# Patient Record
Sex: FEMALE | Race: WHITE | NOT HISPANIC OR LATINO | Employment: UNEMPLOYED | URBAN - METROPOLITAN AREA
[De-identification: names, ages, dates, MRNs, and addresses within clinical notes are randomized per-mention and may not be internally consistent; named-entity substitution may affect disease eponyms.]

---

## 2018-01-13 ENCOUNTER — HOSPITAL ENCOUNTER (INPATIENT)
Facility: HOSPITAL | Age: 28
LOS: 2 days | Discharge: HOME | End: 2018-01-15
Attending: OBSTETRICS & GYNECOLOGY | Admitting: OBSTETRICS & GYNECOLOGY

## 2018-01-13 LAB
ALLENS TEST: ABNORMAL
BASOPHILS # BLD AUTO: 0.01 K/UL
BASOPHILS NFR BLD: 0.1 %
DIFFERENTIAL METHOD: ABNORMAL
EOSINOPHIL # BLD AUTO: 0 K/UL
EOSINOPHIL NFR BLD: 0 %
ERYTHROCYTE [DISTWIDTH] IN BLOOD BY AUTOMATED COUNT: 13 %
HCO3 UR-SCNC: 19 MMOL/L (ref 24–28)
HCT VFR BLD AUTO: 31.6 %
HGB BLD-MCNC: 11.1 G/DL
HIV1+2 IGG SERPL QL IA.RAPID: NEGATIVE
LYMPHOCYTES # BLD AUTO: 1.6 K/UL
LYMPHOCYTES NFR BLD: 8.5 %
MCH RBC QN AUTO: 34.8 PG
MCHC RBC AUTO-ENTMCNC: 35.1 G/DL
MCV RBC AUTO: 99 FL
MONOCYTES # BLD AUTO: 0.9 K/UL
MONOCYTES NFR BLD: 4.6 %
NEUTROPHILS # BLD AUTO: 16.8 K/UL
NEUTROPHILS NFR BLD: 86.8 %
PCO2 BLDA: 32.4 MMHG (ref 35–45)
PH SMN: 7.38 [PH] (ref 7.35–7.45)
PLATELET # BLD AUTO: 218 K/UL
PMV BLD AUTO: 9.6 FL
PO2 BLDA: 32 MMHG (ref 80–100)
POC BE: -6 MMOL/L
POC SATURATED O2: 62 % (ref 95–100)
RBC # BLD AUTO: 3.19 M/UL
RPR SER QL: NORMAL
SAMPLE: ABNORMAL
SITE: ABNORMAL
WBC # BLD AUTO: 19.28 K/UL

## 2018-01-13 PROCEDURE — 59400 OBSTETRICAL CARE: CPT | Mod: ,,, | Performed by: OBSTETRICS & GYNECOLOGY

## 2018-01-13 PROCEDURE — 25000003 PHARM REV CODE 250: Performed by: MIDWIFE

## 2018-01-13 PROCEDURE — 82803 BLOOD GASES ANY COMBINATION: CPT

## 2018-01-13 PROCEDURE — 86850 RBC ANTIBODY SCREEN: CPT

## 2018-01-13 PROCEDURE — 86592 SYPHILIS TEST NON-TREP QUAL: CPT

## 2018-01-13 PROCEDURE — 11000001 HC ACUTE MED/SURG PRIVATE ROOM

## 2018-01-13 PROCEDURE — 36416 COLLJ CAPILLARY BLOOD SPEC: CPT

## 2018-01-13 PROCEDURE — 85025 COMPLETE CBC W/AUTO DIFF WBC: CPT

## 2018-01-13 PROCEDURE — 72100002 HC LABOR CARE, 1ST 8 HOURS

## 2018-01-13 PROCEDURE — 86762 RUBELLA ANTIBODY: CPT

## 2018-01-13 PROCEDURE — 86703 HIV-1/HIV-2 1 RESULT ANTBDY: CPT

## 2018-01-13 PROCEDURE — 87340 HEPATITIS B SURFACE AG IA: CPT

## 2018-01-13 PROCEDURE — 72200005 HC VAGINAL DELIVERY LEVEL II

## 2018-01-13 PROCEDURE — 99900035 HC TECH TIME PER 15 MIN (STAT)

## 2018-01-13 RX ORDER — AMMONIA 15 % (W/V)
0.3 AMPUL (EA) INHALATION CONTINUOUS PRN
Status: DISCONTINUED | OUTPATIENT
Start: 2018-01-13 | End: 2018-01-15 | Stop reason: HOSPADM

## 2018-01-13 RX ORDER — HYDROCORTISONE 25 MG/G
CREAM TOPICAL 3 TIMES DAILY PRN
Status: DISCONTINUED | OUTPATIENT
Start: 2018-01-13 | End: 2018-01-15 | Stop reason: HOSPADM

## 2018-01-13 RX ORDER — DIPHENHYDRAMINE HCL 25 MG
25 CAPSULE ORAL EVERY 4 HOURS PRN
Status: DISCONTINUED | OUTPATIENT
Start: 2018-01-13 | End: 2018-01-15 | Stop reason: HOSPADM

## 2018-01-13 RX ORDER — OXYCODONE AND ACETAMINOPHEN 5; 325 MG/1; MG/1
1 TABLET ORAL EVERY 4 HOURS PRN
Status: DISCONTINUED | OUTPATIENT
Start: 2018-01-13 | End: 2018-01-15 | Stop reason: HOSPADM

## 2018-01-13 RX ORDER — ACETAMINOPHEN 325 MG/1
650 TABLET ORAL EVERY 6 HOURS PRN
Status: DISCONTINUED | OUTPATIENT
Start: 2018-01-13 | End: 2018-01-15 | Stop reason: HOSPADM

## 2018-01-13 RX ORDER — DOCUSATE SODIUM 100 MG/1
100 CAPSULE, LIQUID FILLED ORAL DAILY
Status: DISCONTINUED | OUTPATIENT
Start: 2018-01-14 | End: 2018-01-15 | Stop reason: HOSPADM

## 2018-01-13 RX ORDER — ONDANSETRON 8 MG/1
8 TABLET, ORALLY DISINTEGRATING ORAL EVERY 8 HOURS PRN
Status: DISCONTINUED | OUTPATIENT
Start: 2018-01-13 | End: 2018-01-15 | Stop reason: HOSPADM

## 2018-01-13 RX ORDER — IBUPROFEN 600 MG/1
600 TABLET ORAL EVERY 6 HOURS
Status: DISCONTINUED | OUTPATIENT
Start: 2018-01-13 | End: 2018-01-15 | Stop reason: HOSPADM

## 2018-01-13 RX ADMIN — IBUPROFEN 600 MG: 600 TABLET, FILM COATED ORAL at 11:01

## 2018-01-13 RX ADMIN — IBUPROFEN 600 MG: 600 TABLET, FILM COATED ORAL at 06:01

## 2018-01-14 LAB
ABO + RH BLD: NORMAL
ABO + RH BLD: NORMAL
AMPHET+METHAMPHET UR QL: NORMAL
BARBITURATES UR QL SCN>200 NG/ML: NEGATIVE
BENZODIAZ UR QL SCN>200 NG/ML: NEGATIVE
BLD GP AB SCN CELLS X3 SERPL QL: NORMAL
BLD GP AB SCN CELLS X3 SERPL QL: NORMAL
BZE UR QL SCN: NEGATIVE
CANNABINOIDS UR QL SCN: NORMAL
CREAT UR-MCNC: 58 MG/DL
FETAL CELL SCN BLD QL ROSETTE: NORMAL
INJECT RH IG VOL PATIENT: NORMAL ML
METHADONE UR QL SCN>300 NG/ML: NEGATIVE
OPIATES UR QL SCN: NEGATIVE
PCP UR QL SCN>25 NG/ML: NEGATIVE
TOXICOLOGY INFORMATION: NORMAL

## 2018-01-14 PROCEDURE — 36415 COLL VENOUS BLD VENIPUNCTURE: CPT

## 2018-01-14 PROCEDURE — 80307 DRUG TEST PRSMV CHEM ANLYZR: CPT

## 2018-01-14 PROCEDURE — 99231 SBSQ HOSP IP/OBS SF/LOW 25: CPT | Mod: ,,, | Performed by: ADVANCED PRACTICE MIDWIFE

## 2018-01-14 PROCEDURE — 11000001 HC ACUTE MED/SURG PRIVATE ROOM

## 2018-01-14 PROCEDURE — 25000003 PHARM REV CODE 250: Performed by: MIDWIFE

## 2018-01-14 PROCEDURE — 86901 BLOOD TYPING SEROLOGIC RH(D): CPT

## 2018-01-14 PROCEDURE — 85461 HEMOGLOBIN FETAL: CPT

## 2018-01-14 RX ADMIN — IBUPROFEN 600 MG: 600 TABLET, FILM COATED ORAL at 12:01

## 2018-01-14 RX ADMIN — IBUPROFEN 600 MG: 600 TABLET, FILM COATED ORAL at 06:01

## 2018-01-14 NOTE — PLAN OF CARE
Problem: Patient Care Overview  Goal: Plan of Care Review  Outcome: Ongoing (interventions implemented as appropriate)  VVS.vaible patient. Fundus below umbilicus. Pain well controlled with p.o. Pain medication. Lochia rubra and light per patient report. Safety maintained . Progress will continue to be monitored.

## 2018-01-14 NOTE — HOSPITAL COURSE
Pt admitted for precipitous labor and delivery, infant delivered in marilou breech position with Dr Hardy  1/14/18 pp day 1, routine pp care  01/15/18-routine PP care

## 2018-01-14 NOTE — PROGRESS NOTES
[facility] -   Obstetrics  Postpartum Progress Note    Patient Name: Bhavin Jaramillo  MRN: [id number]  Admission Date: 1/13/2018  Hospital Length of Stay: 1 days  Attending Physician: Mila Hardy, *  Primary Care Provider: Ceferino Dias MD    Subjective:     Principal Problem:Precipitous delivery, delivered (current hospitalization)    Hospital course: Pt admitted for precipitous labor and delivery, infant delivered in marilou breech position with Dr Hardy  1/14/18 pp day 1, routine pp care    Interval History:     She is doing well this morning. She is tolerating a regular diet without nausea or vomiting. She is voiding spontaneously. She is ambulating. She has passed flatus, and has not a BM. Vaginal bleeding is mild. She denies fever or chills. Abdominal pain is mild and controlled with oral medications. She is not breastfeeding. She desires circumcision for her male baby: not applicable.    Objective:     Vital Signs (Most Recent):  Temp: 97.6 °F (36.4 °C) (01/14/18 0000)  Pulse: 75 (01/14/18 0000)  Resp: 18 (01/14/18 0000)  BP: 125/76 (01/14/18 0000) Vital Signs (24h Range):  Temp:  [97.6 °F (36.4 °C)-98.5 °F (36.9 °C)] 97.6 °F (36.4 °C)  Pulse:  [66-92] 75  Resp:  [18-20] 18  BP: (122-156)/(65-95) 125/76     Weight: 70.3 kg (155 lb)  Body mass index is 23.57 kg/m².      Intake/Output Summary (Last 24 hours) at 01/14/18 0748  Last data filed at 01/14/18 0000   Gross per 24 hour   Intake                0 ml   Output              450 ml   Net             -450 ml       Significant Labs:  Lab Results   Component Value Date    GROUPTRH B NEG 01/13/2018       Recent Labs  Lab 01/13/18  2135   HGB 11.1*   HCT 31.6*       I have personallly reviewed all pertinent lab results from the last 24 hours.    Physical Exam:   Constitutional: She is oriented to person, place, and time. She appears well-developed and well-nourished.             Abdominal: Soft.   Fundus firm below umbilicus      Genitourinary:   Genitourinary Comments: Small lochia           Musculoskeletal: Normal range of motion and moves all extremeties.       Neurological: She is alert and oriented to person, place, and time.    Skin: Skin is warm and dry.    Psychiatric: She has a normal mood and affect. Her behavior is normal.       Assessment/Plan:     27 y.o. female  for:    * Precipitous delivery, delivered (current hospitalization)    Routine PP care  Infant to NICU            Disposition: As patient meets milestones, will plan to discharge tomorrow.    ROSE Fleming CNM  Obstetrics  [facility] [place]

## 2018-01-14 NOTE — PROGRESS NOTES
During assessment, two quarter sized clots noted on jesu pad; fundus is firm and at umbilicus, moderate amount of lochia on pad. Instructed pt to call nurse if saturating a jesu pad within 1 hour or less or if any golf ball sized or bigger clots are passed. Pt verbalized understanding.

## 2018-01-14 NOTE — L&D DELIVERY NOTE
of viable female infant in marilou breech position, once buttocks and hips delivered by maternal pushing efforts, baby's L hip popped, Dr sanchez entered room and delivered rest of infant by rotation and flexion of body over mom's abdomen, cord double clamped and cut, infant handed to NICU team for eval due to no prenatal care and prematurity, placenta delivered spont and intact via velázquez mechanism, upon inspection of perineum and vagina, noted to be intact, infant to NICU, mom stable in LDR       Delivery Information for  Lyndsay Jaramillo    Birth information:  YOB: 2017   Time of birth: 4:52 PM   Sex: female   Head Delivery Date/Time: 2018  4:52 PM   Delivery type: Vaginal, Breech   Gestational Age: <None>    Delivery Providers    Delivering clinician:  Fly Wood CNM   Other personnel:   Provider Role   Caterina Da Silva RN Registered Nurse   April Mahmood, RN Registered Nurse   Elicia Hinojosa, NP Nurse Practitioner   Promise Drew RN Registered Nurse   Ellen Gaines, RRT                North Measurements    Weight:  1899 g Length:  45 cm   Head circum.:  31 cm            Assessment    Living status:  Living  Apgars:     1 Minute:   5 Minute:   10 Minute:   15 Minute:   20 Minute:     Skin Color:   0  1       Heart Rate:   2  2       Reflex Irritability:   2  2       Muscle Tone:   1  2       Respiratory Effort:   2  2       Total:   7  9               Apgars Assigned By:  MIGUEL RODRIGEZ         Assisted Delivery Details:    Forceps attempted?:  No  Vacuum extractor attempted?:  No         Shoulder Dystocia    Shoulder dystocia present?:  No           Presentation and Position    Presentation:   Marilou Breech   Position:                   Interventions/Resuscitation    Method:  Bulb Suctioning, Tactile Stimulation, NICU Attended, Deep Suctioning, PPV       Cord    Vessels:  3 vessels  Complications:  None  Delayed Cord Clamping?:  No  Cord Clamped Date/Time:  2018   4:52 PM  Cord Blood Disposition:  Lab  Gases Sent?:  Yes  Stem Cell Collection (by MD):  No       Placenta    Date and time:  2018  5:03 PM  Removal:  Spontaneous  Appearance:  Intact  Placenta disposition:  discarded           Labor Events:       labor: Yes     Labor Onset Date/Time:         Dilation Complete Date/Time: 2018 16:45     Start Pushing Date/Time: 2018 16:45     Rupture Date/Time:              Rupture type:           Fluid Amount:        Fluid Color:        Fluid Odor:        Membrane Status (PeriCalm):        Rupture Date/Time (PeriCalm):        Fluid Amount (PeriCalm):        Fluid Color (PeriCalm):         steroids: None     Antibiotics given for GBS: No     Induction: none     Indications for induction:        Augmentation:       Indications for augmentation:       Labor complications: None     Additional complications:          Cervical ripening:                     Delivery:      Episiotomy: None     Indication for Episiotomy:       Perineal Lacerations: None Repaired:      Periurethral Laceration: none Repaired:     Labial Laceration: none Repaired:     Sulcus Laceration: none Repaired:     Vaginal Laceration: No Repaired:     Cervical Laceration: No Repaired:     Repair suture:       Repair # of packets:       Vaginal delivery QBL (mL): 150      QBL (mL): 0     Combined Blood Loss (mL): 150     Vaginal Sweep Performed: No     Surgicount Correct:         Other providers:       Anesthesia    Method:  None          Details (if applicable):  Trial of Labor      Categorization:      Priority:     Indications for :     Incision Type:       Additional  information:  Forceps:    Vacuum:    Breech:    Observed anomalies    Other (Comments):

## 2018-01-14 NOTE — SUBJECTIVE & OBJECTIVE
Hospital course: Pt admitted for precipitous labor and delivery, infant delivered in marilou breech position with Dr Hardy  1/14/18 pp day 1, routine pp care    Interval History:     She is doing well this morning. She is tolerating a regular diet without nausea or vomiting. She is voiding spontaneously. She is ambulating. She has passed flatus, and has not a BM. Vaginal bleeding is mild. She denies fever or chills. Abdominal pain is mild and controlled with oral medications. She is not breastfeeding. She desires circumcision for her male baby: not applicable.    Objective:     Vital Signs (Most Recent):  Temp: 97.6 °F (36.4 °C) (01/14/18 0000)  Pulse: 75 (01/14/18 0000)  Resp: 18 (01/14/18 0000)  BP: 125/76 (01/14/18 0000) Vital Signs (24h Range):  Temp:  [97.6 °F (36.4 °C)-98.5 °F (36.9 °C)] 97.6 °F (36.4 °C)  Pulse:  [66-92] 75  Resp:  [18-20] 18  BP: (122-156)/(65-95) 125/76     Weight: 70.3 kg (155 lb)  Body mass index is 23.57 kg/m².      Intake/Output Summary (Last 24 hours) at 01/14/18 0748  Last data filed at 01/14/18 0000   Gross per 24 hour   Intake                0 ml   Output              450 ml   Net             -450 ml       Significant Labs:  Lab Results   Component Value Date    GROUPTR B NEG 01/13/2018       Recent Labs  Lab 01/13/18  2135   HGB 11.1*   HCT 31.6*       I have personallly reviewed all pertinent lab results from the last 24 hours.    Physical Exam:   Constitutional: She is oriented to person, place, and time. She appears well-developed and well-nourished.             Abdominal: Soft.   Fundus firm below umbilicus     Genitourinary:   Genitourinary Comments: Small lochia           Musculoskeletal: Normal range of motion and moves all extremeties.       Neurological: She is alert and oriented to person, place, and time.    Skin: Skin is warm and dry.    Psychiatric: She has a normal mood and affect. Her behavior is normal.

## 2018-01-14 NOTE — H&P
[facility] -   Obstetrics  History & Physical    Patient Name: Bhavin Jaramillo  MRN: [id number]  Admission Date: 2018  Primary Care Provider: Ceferino Dias MD    Subjective:     Principal Problem:Precipitous delivery, delivered (current hospitalization)    History of Present Illness:  Pt presented to ED from home with abdominal pain, pt noted to be pregnant by US in ED and presenting part in vagina per ED MD, pt transported to floor via stretcher by ED staff    Obstetric HPI:  Patient reports abdominal pain, did not know she was pregnant. Unsure of when ROM    This pregnancy has been complicated by hx of weight loss,hx of gastric sleeve,  unknown pregnancy    Obstetric History       T0      L1     SAB0   TAB0   Ectopic0   Multiple0   Live Births1       # Outcome Date GA Lbr Damion/2nd Weight Sex Delivery Anes PTL Lv   1 Para 18  / 00:07 1.899 kg (4 lb 3 oz) F Vag-Breech None Y EZEQUIEL      Name: NITO, RENETTA DAVALOS      Apgar1:  7                Apgar5: 9        Past Medical History:   Diagnosis Date    GERD (gastroesophageal reflux disease)      Past Surgical History:   Procedure Laterality Date    gastric sleeve         PTA Medications   Medication Sig    ibuprofen (ADVIL,MOTRIN) 800 MG tablet Take 1 tablet (800 mg total) by mouth every 6 (six) hours as needed for Pain.       Review of patient's allergies indicates:  No Known Allergies     Family History     None        Social History Main Topics    Smoking status: Current Every Day Smoker     Packs/day: 1.00    Smokeless tobacco: Not on file    Alcohol use Yes    Drug use: Yes     Types: Marijuana    Sexual activity: Yes     Review of Systems   Gastrointestinal: Positive for abdominal pain.      Objective:     Vital Signs (Most Recent):  Temp: 98.5 °F (36.9 °C) (18 1711)  Pulse: 66 (18 1831)  Resp: 20 (18 1816)  BP: (!) 142/87 (18 1831) Vital Signs (24h Range):  Temp:  [98.5 °F (36.9 °C)] 98.5 °F  (36.9 °C)  Pulse:  [66-92] 66  Resp:  [20] 20  BP: (139-154)/(65-94) 142/87     Weight: 70.3 kg (155 lb)  Body mass index is 23.57 kg/m².      Physical Exam:   Constitutional: She is oriented to person, place, and time. She appears well-developed and well-nourished.      Neck: Normal range of motion.     Pulmonary/Chest: Effort normal.        Abdominal: Soft.             Musculoskeletal: Normal range of motion.       Neurological: She is alert and oriented to person, place, and time.     Psychiatric: She has a normal mood and affect. Her behavior is normal. Judgment and thought content normal.       Cervix:  Dilation:  10  Effacement:  100%  Station: 3  Presentation: Breech     Significant Labs:  No results found for: GROUPTRH, HEPBSAG, RUBELLAIGGSC, STREPBCULT, AFP, [id number]    I have personallly reviewed all pertinent lab results from the last 24 hours.  Recent Lab Results       18  1714      Allens Test N/A     Site Other     POC BE -6     POC HCO3 19.0(L)     POC PCO2 32.4(L)     POC PH 7.377     POC PO2 32(L)     POC SATURATED O2 62(L)     Sample UMBILICAL CORD         Assessment/Plan:     27 y.o. female  at Unknown for:    Precipitous delivery, delivered (current hospitalization)    Routine PP care  Infant to NICU            Fly Wood CNM  Obstetrics  [facility]

## 2018-01-14 NOTE — SUBJECTIVE & OBJECTIVE
Obstetric HPI:  Patient reports abdominal pain, did not know she was pregnant. Unsure of when ROM    This pregnancy has been complicated by hx of weight loss,hx of gastric sleeve,  unknown pregnancy    Obstetric History       T0      L1     SAB0   TAB0   Ectopic0   Multiple0   Live Births1       # Outcome Date GA Lbr Damion/2nd Weight Sex Delivery Anes PTL Lv   1 Para 18  / 00:07 1.899 kg (4 lb 3 oz) F Vag-Breech None Y EZEQUIEL      Name: RENETTA BEAUCHAMP      Apgar1:  7                Apgar5: 9        Past Medical History:   Diagnosis Date    GERD (gastroesophageal reflux disease)      Past Surgical History:   Procedure Laterality Date    gastric sleeve         PTA Medications   Medication Sig    ibuprofen (ADVIL,MOTRIN) 800 MG tablet Take 1 tablet (800 mg total) by mouth every 6 (six) hours as needed for Pain.       Review of patient's allergies indicates:  No Known Allergies     Family History     None        Social History Main Topics    Smoking status: Current Every Day Smoker     Packs/day: 1.00    Smokeless tobacco: Not on file    Alcohol use Yes    Drug use: Yes     Types: Marijuana    Sexual activity: Yes     Review of Systems   Gastrointestinal: Positive for abdominal pain.      Objective:     Vital Signs (Most Recent):  Temp: 98.5 °F (36.9 °C) (18 171)  Pulse: 66 (18 183)  Resp: 20 (18 181)  BP: (!) 142/87 (18) Vital Signs (24h Range):  Temp:  [98.5 °F (36.9 °C)] 98.5 °F (36.9 °C)  Pulse:  [66-92] 66  Resp:  [20] 20  BP: (139-154)/(65-94) 142/87     Weight: 70.3 kg (155 lb)  Body mass index is 23.57 kg/m².      Physical Exam:   Constitutional: She is oriented to person, place, and time. She appears well-developed and well-nourished.      Neck: Normal range of motion.     Pulmonary/Chest: Effort normal.        Abdominal: Soft.             Musculoskeletal: Normal range of motion.       Neurological: She is alert and oriented to person, place, and time.      Psychiatric: She has a normal mood and affect. Her behavior is normal. Judgment and thought content normal.       Cervix:  Dilation:  10  Effacement:  100%  Station: 3  Presentation: Breech     Significant Labs:  No results found for: GROUPTRH, HEPBSAG, RUBELLAIGGSC, STREPBCULT, AFP, [id number]    I have personallly reviewed all pertinent lab results from the last 24 hours.  Recent Lab Results       01/13/18  1714      Allens Test N/A     Site Other     POC BE -6     POC HCO3 19.0(L)     POC PCO2 32.4(L)     POC PH 7.377     POC PO2 32(L)     POC SATURATED O2 62(L)     Sample UMBILICAL CORD

## 2018-01-14 NOTE — PLAN OF CARE
Problem: Patient Care Overview  Goal: Plan of Care Review  Outcome: Ongoing (interventions implemented as appropriate)  Pt progressing well. No issues noted currently. No c/o pain or discomfort. Fundus firm with light to moderate lochia. Ambulating and voiding without difficulty; one more measured void needed. Family at bedside. Visited baby in NICU once this shift. Vitals stable. Will continue to monitor.

## 2018-01-14 NOTE — HPI
Pt presented to ED from home with abdominal pain, pt noted to be pregnant by US in ED and presenting part in vagina per ED MD, pt transported to floor via stretcher by ED staff

## 2018-01-15 VITALS
HEIGHT: 68 IN | RESPIRATION RATE: 18 BRPM | HEART RATE: 75 BPM | SYSTOLIC BLOOD PRESSURE: 139 MMHG | DIASTOLIC BLOOD PRESSURE: 84 MMHG | BODY MASS INDEX: 23.49 KG/M2 | WEIGHT: 155 LBS | TEMPERATURE: 98 F

## 2018-01-15 LAB
HBV SURFACE AG SERPL QL IA: NEGATIVE
RUBV IGG SER-ACNC: <5 IU/ML
RUBV IGG SER-IMP: ABNORMAL

## 2018-01-15 PROCEDURE — 90471 IMMUNIZATION ADMIN: CPT | Performed by: MIDWIFE

## 2018-01-15 PROCEDURE — 25000003 PHARM REV CODE 250: Performed by: MIDWIFE

## 2018-01-15 PROCEDURE — 99238 HOSP IP/OBS DSCHRG MGMT 30/<: CPT | Mod: ,,, | Performed by: ADVANCED PRACTICE MIDWIFE

## 2018-01-15 PROCEDURE — 90715 TDAP VACCINE 7 YRS/> IM: CPT | Performed by: MIDWIFE

## 2018-01-15 PROCEDURE — 63600519 RHOGAM PHARM REV CODE 636 ALT 250 W HCPCS: Performed by: OBSTETRICS & GYNECOLOGY

## 2018-01-15 PROCEDURE — 63600175 PHARM REV CODE 636 W HCPCS: Performed by: MIDWIFE

## 2018-01-15 RX ADMIN — IBUPROFEN 600 MG: 600 TABLET, FILM COATED ORAL at 12:01

## 2018-01-15 RX ADMIN — CLOSTRIDIUM TETANI TOXOID ANTIGEN (FORMALDEHYDE INACTIVATED), CORYNEBACTERIUM DIPHTHERIAE TOXOID ANTIGEN (FORMALDEHYDE INACTIVATED), BORDETELLA PERTUSSIS TOXOID ANTIGEN (GLUTARALDEHYDE INACTIVATED), BORDETELLA PERTUSSIS FILAMENTOUS HEMAGGLUTININ ANTIGEN (FORMALDEHYDE INACTIVATED), BORDETELLA PERTUSSIS PERTACTIN ANTIGEN, AND BORDETELLA PERTUSSIS FIMBRIAE 2/3 ANTIGEN 0.5 ML: 5; 2; 2.5; 5; 3; 5 INJECTION, SUSPENSION INTRAMUSCULAR at 09:01

## 2018-01-15 RX ADMIN — IBUPROFEN 600 MG: 600 TABLET, FILM COATED ORAL at 05:01

## 2018-01-15 RX ADMIN — HUMAN RHO(D) IMMUNE GLOBULIN 300 MCG: 300 INJECTION, SOLUTION INTRAMUSCULAR at 05:01

## 2018-01-15 NOTE — CONSULTS
Met with pt and s/o, Chip Zelaya, per consult and to complete NICU discharge planning assessment. Pt's UDS +THC and amphetamine. Pt honest about marijuana use and reports she has prescription for Adderall at home. MSW explained mandated reporting of drug exposed newborns, and pt expressed understanding.   She plans to be discharged today and will get essential items for baby before discharge from NICU.  Pediatrician will be Dr. France De La Rosa.  DCFS report made due to  +THC. Intake #[id number].  Will follow while baby is in NICU.

## 2018-01-15 NOTE — PLAN OF CARE
Problem: Patient Care Overview  Goal: Plan of Care Review  Outcome: Ongoing (interventions implemented as appropriate)  Pt progressing well. Up ad ramon. Voids spontaneously without difficulty. Pain controlled with oral pain medication. Bonding well with baby, visits baby in NICU. Will continue to monitor, VSS.

## 2018-01-15 NOTE — SUBJECTIVE & OBJECTIVE
Hospital course: Pt admitted for precipitous labor and delivery, infant delivered in marilou breech position with Dr Hardy  1/14/18 pp day 1, routine pp care  01/15/18-routine PP care    Interval History: Doing well. Eating breakfast this morning with family at the bedside.     She is doing well this morning. She is tolerating a regular diet without nausea or vomiting. She is voiding spontaneously. She is ambulating. She has passed flatus, and has a BM. Vaginal bleeding is mild. She denies fever or chills. Abdominal pain is mild and controlled with oral medications. She is not breastfeeding. She desires circumcision for her male baby: not applicable.    Objective:     Vital Signs (Most Recent):  Temp: 98.1 °F (36.7 °C) (01/15/18 0800)  Pulse: 75 (01/15/18 0800)  Resp: 18 (01/15/18 0800)  BP: 139/84 (01/15/18 0800) Vital Signs (24h Range):  Temp:  [97.9 °F (36.6 °C)-98.4 °F (36.9 °C)] 98.1 °F (36.7 °C)  Pulse:  [64-85] 75  Resp:  [16-18] 18  BP: (116-139)/(73-86) 139/84     Weight: 70.3 kg (155 lb)  Body mass index is 23.57 kg/m².    No intake or output data in the 24 hours ending 01/15/18 0910    Significant Labs:  Lab Results   Component Value Date    GROUPTR B NEG 01/14/2018       Recent Labs  Lab 01/13/18  2135   HGB 11.1*   HCT 31.6*       I have personallly reviewed all pertinent lab results from the last 24 hours.    Physical Exam:   Constitutional: She is oriented to person, place, and time. She appears well-developed and well-nourished.    HENT:   Head: Normocephalic.     Neck: Normal range of motion.    Cardiovascular: Normal rate, regular rhythm and normal heart sounds.     Pulmonary/Chest: Effort normal and breath sounds normal.        Abdominal: Soft.     Genitourinary: Uterus normal.           Musculoskeletal: Normal range of motion and moves all extremeties.       Neurological: She is alert and oriented to person, place, and time. She has normal reflexes.    Skin: Skin is warm and dry.    Psychiatric:  She has a normal mood and affect. Her behavior is normal.

## 2018-01-15 NOTE — NURSING
Written and verbal discharge instructions given to pt. Pt verbalized understanding. Pt refused to ride in wheelchair for discharge; pt ambulated.

## 2018-01-15 NOTE — DISCHARGE INSTRUCTIONS
"Mother Self Care:    Activity: Avoid strenuous exercise and get adequate rest.  No driving until the physician consent given.  Emotional Changes: Most women find birth to be a time of great emotional upheaval.  Sense of loss, mood swings, fatigue, anxiety, and feeling "let down" are common.  If feelings worsen or last more than a week, call your physician.  Breast Care/Bottle Feeding: Wear support bra 24 hours a day for one week.  Avoid stimulation to breasts.  You may use ice packs for discomfort.  Jesu-Care/Vaginal Bleeding: Remember to use your jesu-bottle after urinating.  Your flow will change from red, to pink, to yellow/white color over a period of 2 weeks.  Menstruation will return in 3-8 weeks, or longer if breastfeeding.  Sexual Activity/Pelvic Rest: No sexual activity, tampons, or douching until your physician gives you consent.  Diet: Continue to eat from the five basic food groups, including plenty of protein, fruits, vegetables, and whole grains.  Limit empty calories and high fat foods.  Drink enough fluids to satisfy thirst and add an extra 500 calories for breastfeeding.  Constipation/Hemorrhoids: Drink plenty of water.  You may take a stool softener or natural laxative (Metamucil). You may use tucks or hemorrhoid ointment and soak in a warm tub.    CALL YOUR OB DOCTOR IF ANY OF THE FOLLOWING OCCURS:  *Heavy bleeding - saturating a pad an hour or passing any large (2-3 inches in size) blood clots.  *Any pain, redness, or tenderness in lower leg.  *You cannot care for yourself or your baby.  *Any signs of infection-      - Temperature greater than 100.5 degrees F      - Foul smelling vaginal discharge       - Increased perineal pain      - Hot, hard, red or sore area on breast      - Flu-like symptoms      - Any urgency, frequency or burning with urination    "

## 2018-01-15 NOTE — DISCHARGE SUMMARY
[facility] -   Obstetrics  Discharge Summary      Patient Name: Bhavin Jaramillo  MRN: [id number]  Admission Date: 1/13/2018  Hospital Length of Stay: 2 days  Discharge Date and Time: No discharge date for patient encounter.  Attending Physician: Mila Hardy, *   Discharging Provider: Jina Ramirez CNM  Primary Care Provider: Ceferino Dias MD    HPI: Pt presented to ED from home with abdominal pain, pt noted to be pregnant by US in ED and presenting part in vagina per ED MD, pt transported to floor via stretcher by ED staff    * No surgery found *     Hospital Course:   Pt admitted for precipitous labor and delivery, infant delivered in marilou breech position with Dr Hardy  1/14/18 pp day 1, routine pp care  01/15/18-routine PP care    Consults         Status Ordering Provider     Inpatient consult to Social Work  Once     Provider:  (Not yet assigned)    Acknowledged HEAVEN DOYLE          Final Active Diagnoses:    Diagnosis Date Noted POA    PRINCIPAL PROBLEM:  Precipitous delivery, delivered (current hospitalization) [O62.3] 01/13/2018 Yes      Problems Resolved During this Admission:    Diagnosis Date Noted Date Resolved POA        Labs: All labs within the past 24 hours have been reviewed    Feeding Method: bottle    Immunizations     Date Immunization Status Dose Route/Site Given by    01/13/18 1810 MMR Incomplete 0.5 mL Subcutaneous/Left deltoid     01/13/18 1810 Tdap Incomplete 0.5 mL Intramuscular/Left deltoid     01/15/18 0552 Rho (D) Immune Globulin - IM Given 300 mcg Intramuscular/Left Ventrogluteal Patrick Godoy LPN          Delivery:    Episiotomy: None   Lacerations: None   Repair suture:     Repair # of packets:     Blood loss (ml): 150     Birth information:  YOB: 2017   Time of birth: 4:52 PM   Sex: female   Delivery type: Vaginal, Breech   Gestational Age: 35w0d    Delivery Clinician:      Other providers:       Additional   information:  Forceps:    Vacuum:    Breech:    Observed anomalies      Living?:           APGARS  One minute Five minutes Ten minutes   Skin color:         Heart rate:         Grimace:         Muscle tone:         Breathing:         Totals: 7  9        Placenta: Delivered:       appearance    Pending Diagnostic Studies:     Procedure Component Value Units Date/Time    Hepatitis B surface antigen [[id number]] Collected:  01/13/18 2134    Order Status:  Sent Lab Status:  In process Updated:  01/14/18 1713    Specimen:  Blood from Blood     Rubella antibody, IgG [[id number]] Collected:  01/13/18 2134    Order Status:  Sent Lab Status:  In process Updated:  01/14/18 1713    Specimen:  Blood from Blood           Discharged Condition: stable    Disposition: Home or Self Care    Follow Up:  Follow-up Information     [facility] In 4 weeks.    Why:  Postpartum   Contact information:  [facility]               Patient Instructions:     Notify your health care provider if you experience any of the following:  temperature >100.4     Notify your health care provider if you experience any of the following:  persistent nausea and vomiting or diarrhea     Notify your health care provider if you experience any of the following:  severe uncontrolled pain     Notify your health care provider if you experience any of the following:  severe persistent headache     Notify your health care provider if you experience any of the following:  persistent dizziness, light-headedness, or visual disturbances     Notify your health care provider if you experience any of the following:  increased confusion or weakness       Medications:  Current Discharge Medication List      CONTINUE these medications which have NOT CHANGED    Details   ibuprofen (ADVIL,MOTRIN) 800 MG tablet Take 1 tablet (800 mg total) by mouth every 6 (six) hours as needed for Pain.  Qty: 20 tablet, Refills: 0           DAVIDE Underwood,  CNM  Obstetrics  [facility]

## 2018-01-15 NOTE — PROGRESS NOTES
[facility] -   Obstetrics  Postpartum Progress Note    Patient Name: Bhavin Jaramillo  MRN: [id number]  Admission Date: 1/13/2018  Hospital Length of Stay: 2 days  Attending Physician: Mila Hardy, *  Primary Care Provider: Ceferino Dias MD    Subjective:     Principal Problem:Precipitous delivery, delivered (current hospitalization)    Hospital course: Pt admitted for precipitous labor and delivery, infant delivered in marilou breech position with Dr Hardy  1/14/18 pp day 1, routine pp care  01/15/18-routine PP care    Interval History: Doing well. Eating breakfast this morning with family at the bedside.     She is doing well this morning. She is tolerating a regular diet without nausea or vomiting. She is voiding spontaneously. She is ambulating. She has passed flatus, and has a BM. Vaginal bleeding is mild. She denies fever or chills. Abdominal pain is mild and controlled with oral medications. She is not breastfeeding. She desires circumcision for her male baby: not applicable.    Objective:     Vital Signs (Most Recent):  Temp: 98.1 °F (36.7 °C) (01/15/18 0800)  Pulse: 75 (01/15/18 0800)  Resp: 18 (01/15/18 0800)  BP: 139/84 (01/15/18 0800) Vital Signs (24h Range):  Temp:  [97.9 °F (36.6 °C)-98.4 °F (36.9 °C)] 98.1 °F (36.7 °C)  Pulse:  [64-85] 75  Resp:  [16-18] 18  BP: (116-139)/(73-86) 139/84     Weight: 70.3 kg (155 lb)  Body mass index is 23.57 kg/m².    No intake or output data in the 24 hours ending 01/15/18 0910    Significant Labs:  Lab Results   Component Value Date    GROUPTRH B NEG 01/14/2018       Recent Labs  Lab 01/13/18  2135   HGB 11.1*   HCT 31.6*       I have personallly reviewed all pertinent lab results from the last 24 hours.    Physical Exam:   Constitutional: She is oriented to person, place, and time. She appears well-developed and well-nourished.    HENT:   Head: Normocephalic.     Neck: Normal range of motion.    Cardiovascular: Normal rate, regular rhythm and  normal heart sounds.     Pulmonary/Chest: Effort normal and breath sounds normal.        Abdominal: Soft.     Genitourinary: Uterus normal.           Musculoskeletal: Normal range of motion and moves all extremeties.       Neurological: She is alert and oriented to person, place, and time. She has normal reflexes.    Skin: Skin is warm and dry.    Psychiatric: She has a normal mood and affect. Her behavior is normal.       Assessment/Plan:     27 y.o. female  for:    * Precipitous delivery, delivered (current hospitalization)    Routine PP care  Infant to NICU            Disposition: As patient meets milestones, will plan to discharge today.  DAVIDE Underwood CNM  Obstetrics  [facility]